# Patient Record
Sex: MALE | Race: WHITE | URBAN - METROPOLITAN AREA
[De-identification: names, ages, dates, MRNs, and addresses within clinical notes are randomized per-mention and may not be internally consistent; named-entity substitution may affect disease eponyms.]

---

## 2022-07-20 ENCOUNTER — OFFICE VISIT (OUTPATIENT)
Dept: URBAN - METROPOLITAN AREA CLINIC 24 | Facility: CLINIC | Age: 47
End: 2022-07-20
Payer: MEDICARE

## 2022-07-20 DIAGNOSIS — H52.4 PRESBYOPIA: ICD-10-CM

## 2022-07-20 DIAGNOSIS — H35.89 OTHER SPECIFIED RETINAL DISORDERS: Primary | ICD-10-CM

## 2022-07-20 PROCEDURE — 99203 OFFICE O/P NEW LOW 30 MIN: CPT | Performed by: STUDENT IN AN ORGANIZED HEALTH CARE EDUCATION/TRAINING PROGRAM

## 2022-07-20 PROCEDURE — 92134 CPTRZ OPH DX IMG PST SGM RTA: CPT | Performed by: STUDENT IN AN ORGANIZED HEALTH CARE EDUCATION/TRAINING PROGRAM

## 2022-07-20 ASSESSMENT — VISUAL ACUITY
OD: 20/20
OS: 20/20

## 2022-07-20 ASSESSMENT — KERATOMETRY
OD: 43.16
OS: 42.89

## 2022-07-20 ASSESSMENT — INTRAOCULAR PRESSURE
OS: 17
OD: 17

## 2022-07-20 NOTE — IMPRESSION/PLAN
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 Plan: NFL myelination, stable to prev description with Dr Helen Lyles. No intervention necessary, monitor. 
RTC 1 yr for dilated exam

## 2022-07-28 ENCOUNTER — REFRACTIVE (OUTPATIENT)
Dept: URBAN - METROPOLITAN AREA CLINIC 30 | Facility: CLINIC | Age: 47
End: 2022-07-28

## 2022-07-28 DIAGNOSIS — H52.4 PRESBYOPIA: Primary | ICD-10-CM

## 2022-07-28 ASSESSMENT — VISUAL ACUITY
OD: 20/20
OS: 20/20

## 2022-08-12 ENCOUNTER — OFFICE VISIT (OUTPATIENT)
Dept: URBAN - METROPOLITAN AREA CLINIC 30 | Facility: CLINIC | Age: 47
End: 2022-08-12

## 2022-08-12 DIAGNOSIS — H52.4 PRESBYOPIA: Primary | ICD-10-CM

## 2022-08-12 PROCEDURE — V2799 MISC VISION ITEM OR SERVICE: HCPCS | Performed by: STUDENT IN AN ORGANIZED HEALTH CARE EDUCATION/TRAINING PROGRAM

## 2022-08-12 ASSESSMENT — INTRAOCULAR PRESSURE
OD: 16
OS: 16

## 2022-08-12 ASSESSMENT — VISUAL ACUITY
OS: 20/20
OD: 20/20

## 2022-08-12 NOTE — IMPRESSION/PLAN
Impression: Presbyopia: H52.4. Plan: Rx check with stable refraction. Pt ed no change in rx OD despite pt feeling strain OD. PALs match rx released however seg height does not appear matched. Pt's R eye sits higher than L eye but glasses have same seg height. Pt ed can try adjusting glasses to match asymmetrical height or rtc to [facility] who made the lenses to remake with correct seg heights.